# Patient Record
(demographics unavailable — no encounter records)

---

## 2025-07-31 NOTE — HISTORY OF PRESENT ILLNESS
[FreeTextEntry8] : 84 yo male presents for acute concern. Patient c/o intermittent chest pain. Onset 3-4 days ago. States it initially started when he was dancing over the weekend. Patient states when he moves fast or exerts himself pain occurs. Describes pain as sharp, pinching pain. Denies pressure or heaviness of chest. Denies SOB. Patient states he had similar symptoms in past about 6 months ago, went to [facility] ER for symptoms. Per chart review, troponin WNL, CXR WNL, EKG NSR. Patient diagnosed with chest wall pain, discharged home. Patient states he saw cardiologist after discharge, although workup/notes unavailable for my review. Patient states he was given a machine to use overnight to monitor heart rate (Holter monitor?). Unknown what cardiologist name is, states he only saw the doctor one time.

## 2025-07-31 NOTE — PHYSICAL EXAM
[Coordination Grossly Intact] : coordination grossly intact [No Focal Deficits] : no focal deficits [Normal Gait] : normal gait [Normal] : affect was normal and insight and judgment were intact [de-identified] : no reproducible left chest wal pain on palpation

## 2025-07-31 NOTE — PHYSICAL EXAM
[Coordination Grossly Intact] : coordination grossly intact [No Focal Deficits] : no focal deficits [Normal Gait] : normal gait [Normal] : affect was normal and insight and judgment were intact [de-identified] : no reproducible left chest wal pain on palpation

## 2025-07-31 NOTE — HISTORY OF PRESENT ILLNESS
[FreeTextEntry8] : 86 yo male presents for acute concern. Patient c/o intermittent chest pain. Onset 3-4 days ago. States it initially started when he was dancing over the weekend. Patient states when he moves fast or exerts himself pain occurs. Describes pain as sharp, pinching pain. Denies pressure or heaviness of chest. Denies SOB. Patient states he had similar symptoms in past about 6 months ago, went to [facility] ER for symptoms. Per chart review, troponin WNL, CXR WNL, EKG NSR. Patient diagnosed with chest wall pain, discharged home. Patient states he saw cardiologist after discharge, although workup/notes unavailable for my review. Patient states he was given a machine to use overnight to monitor heart rate (Holter monitor?). Unknown what cardiologist name is, states he only saw the doctor one time.